# Patient Record
Sex: MALE | Race: WHITE
[De-identification: names, ages, dates, MRNs, and addresses within clinical notes are randomized per-mention and may not be internally consistent; named-entity substitution may affect disease eponyms.]

---

## 2017-05-15 ENCOUNTER — HOSPITAL ENCOUNTER (EMERGENCY)
Dept: HOSPITAL 74 - JER | Age: 52
Discharge: HOME | End: 2017-05-15
Payer: COMMERCIAL

## 2017-05-15 VITALS — SYSTOLIC BLOOD PRESSURE: 133 MMHG | HEART RATE: 60 BPM | DIASTOLIC BLOOD PRESSURE: 54 MMHG | TEMPERATURE: 98.2 F

## 2017-05-15 VITALS — BODY MASS INDEX: 30.1 KG/M2

## 2017-05-15 DIAGNOSIS — H81.10: Primary | ICD-10-CM

## 2017-05-15 DIAGNOSIS — E78.00: ICD-10-CM

## 2017-05-15 DIAGNOSIS — G43.909: ICD-10-CM

## 2017-05-15 DIAGNOSIS — I10: ICD-10-CM

## 2017-05-15 LAB
ALBUMIN SERPL-MCNC: 3.9 G/DL (ref 3.4–5)
ALP SERPL-CCNC: 77 U/L (ref 45–117)
ALT SERPL-CCNC: 33 U/L (ref 12–78)
ANION GAP SERPL CALC-SCNC: 11 MMOL/L (ref 8–16)
AST SERPL-CCNC: 17 U/L (ref 15–37)
BASOPHILS # BLD: 0.7 % (ref 0–2)
BILIRUB SERPL-MCNC: 1.2 MG/DL (ref 0.2–1)
CALCIUM SERPL-MCNC: 8.6 MG/DL (ref 8.5–10.1)
CO2 SERPL-SCNC: 28 MMOL/L (ref 21–32)
COCKROFT - GAULT: 115.09
CREAT SERPL-MCNC: 1 MG/DL (ref 0.7–1.3)
DEPRECATED RDW RBC AUTO: 14 % (ref 11.9–15.9)
EOSINOPHIL # BLD: 1.8 % (ref 0–4.5)
GLUCOSE SERPL-MCNC: 85 MG/DL (ref 74–106)
INR BLD: 1.03 (ref 0.82–1.09)
MCH RBC QN AUTO: 29.2 PG (ref 25.7–33.7)
MCHC RBC AUTO-ENTMCNC: 32.6 G/DL (ref 32–35.9)
MCV RBC: 89.6 FL (ref 80–96)
NEUTROPHILS # BLD: 68.6 % (ref 42.8–82.8)
PLATELET # BLD AUTO: 165 K/MM3 (ref 134–434)
PMV BLD: 8.8 FL (ref 7.5–11.1)
PROT SERPL-MCNC: 7.5 G/DL (ref 6.4–8.2)
PT PNL PPP: 11.3 SEC (ref 9.98–11.88)
TROPONIN I SERPL-MCNC: < 0.02 NG/ML (ref 0–0.05)
WBC # BLD AUTO: 6.8 K/MM3 (ref 4–10)

## 2017-05-15 NOTE — PDOC
*Physical Exam





- Vital Signs


 Last Vital Signs











Temp Pulse Resp BP Pulse Ox


 


 97.5 F L  85   18   150/98   100 


 


 05/15/17 07:52  05/15/17 07:52  05/15/17 07:52  05/15/17 07:52  05/15/17 07:52














ED Treatment Course





- LABORATORY


CBC & Chemistry Diagram: 


 05/15/17 08:52





 05/15/17 08:52





- ADDITIONAL ORDERS


Additional order review: 


 Laboratory  Results











  05/15/17 05/15/17 05/15/17





  08:52 08:52 08:52


 


INR    1.03


 


Sodium   141 


 


Potassium   4.1 


 


Chloride   102 


 


Carbon Dioxide   28 


 


Anion Gap   11 


 


BUN   20 H 


 


Creatinine   1.0 


 


Creat Clearance w eGFR   > 60 


 


Random Glucose   85 


 


Calcium   8.6 


 


Total Bilirubin   1.2 H 


 


AST   17 


 


ALT   33 


 


Alkaline Phosphatase   77 


 


Creatine Kinase   176 


 


Creatine Kinase Index   1.2 


 


CK-MB (CK-2)   2.021 


 


CK-MB (CK-2) Rel Index  Cancelled  


 


Troponin I   < 0.02 


 


Total Protein   7.5 


 


Albumin   3.9 








 











  05/15/17





  08:52


 


RBC  5.54


 


MCV  89.6


 


MCHC  32.6


 


RDW  14.0


 


MPV  8.8


 


Neutrophils %  68.6


 


Lymphocytes %  21.9


 


Monocytes %  7.0


 


Eosinophils %  1.8


 


Basophils %  0.7














- Medications


Given in the ED: 


ED Medications














Discontinued Medications














Generic Name Dose Route Start Last Admin





  Trade Name Freq  PRN Reason Stop Dose Admin


 


Sodium Chloride  1,000 mls @ 1,000 mls/hr 05/15/17 08:59 05/15/17 09:50





  Normal Saline -  IV 05/15/17 09:58  1,000 mls/hr





  ASDIR STA   Administration


 


Meclizine HCl  25 mg 05/15/17 08:59 05/15/17 09:50





  Antivert -  PO 05/15/17 09:00  25 mg





  ONCE ONE   Administration














Medical Decision Making





- Medical Decision Making





05/15/17 11:15


52yo male with h/o HTN , child toussaint allergies, here wtih c/o vertigo like 

symtpoms. noted when tipping head back, worse wtih turning and movement. no 

gait instability. no tinnitus, no f/c no mod factors. no weaknes, no change to 

speech. 


on exam pt awake, alert. CTAB no wheeze no crackle. heart RRR no m/r/g. abd 

soft NT. skin warm and dry. nuero. awake alert, CN intact, facies symmetric. 

finger to nose normal, heel to shin normal. alt hand movement normal. gait 

normal, neg romberg. pt





plan : pt wtih positional vertigo. ct ordered normal. lasb unremarkable. 

improved wtih meclizine. dc with out pt ENT followup.





*DC/Admit/Observation/Transfer


Diagnosis at time of Disposition: 


 Vertigo





- Prescriptions


Prescriptions: 


Meclizine HCl 25 mg PO Q8H PRN #30 tablet


 PRN Reason: Vertigo





- Referrals


Referrals: 


Zhen Crawford MD [Staff Physician] - 3 days





- Patient Instructions


Printed Discharge Instructions:  DI for Benign Paroxysmal Positional Vertigo


Additional Instructions: 


-Rest and stay well-hydrated


-Take Meclizine as prescribed if needed for vertigo symptoms


-Follow up with neurology this week (referral enclosed)


-Return here for worsening dizziness, weakness in your arms or legs, change in 

speech, inability to walk well, or any other concerning symptoms





- Post Discharge Activity


Work/School Note:  Back to Work

## 2017-05-15 NOTE — PDOC
History of Present Illness





- General


Chief Complaint: Lightheaded


Stated Complaint: DIZZINESS


Time Seen by Provider: 05/15/17 08:39


History Source: Patient


Exam Limitations: No Limitations





- History of Present Illness


Initial Comments: 


05/15/17 08:42


CHIEF COMPLAINT: Dizziness





HISTORY OF PRESENT ILLNESS: This is a 51 year old male with a history of HTN, 

HLD, and migraines who presents complaining of dizziness. He reports that he 

was standing at work this morning when he tilted his head back to drink some 

iced tea and suddenly felt that the room was spinning. He felt nauseated and 

"off balance". The episode passed after a few seconds. He then walked to his 

car and experienced the same symptoms again twice, for a few seconds each time. 

He denies headache, focal weakness, change in speech, change in vision, chest 

pain, shortness of breath, or any other symptoms. On arrival here, he feels 

better but is still complaining of some dizziness.





V/s on arrival are notable for /98.





PCP is located in [place].





REVIEW OF SYSTEMS:


GENERAL/CONSTITUTIONAL: No fever or chills. No weakness. No weight change.


HEAD, EYES, EARS, NOSE AND THROAT: No change in vision. No ear pain or 

discharge. No sore throat.


CARDIOVASCULAR: No chest pain or palpitations.


RESPIRATORY: No cough, wheezing, or shortness of breath.


GASTROINTESTINAL: Nausea. No vomiting, diarrhea or constipation. 


GENITOURINARY: No dysuria, frequency, or change in urination.


MUSCULOSKELETAL: No joint or muscle swelling or pain. No neck or back pain.


SKIN: No rash or easy bruising.


NEUROLOGIC: See HPI.


PSYCHIATRIC: No depression or anxiety.


ENDOCRINE: No increased thirst. No abnormal weight change.


HEMATOLOGIC/LYMPHATIC: No anemia, easy bleeding, or history of blood clots.


ALLERGIC/IMMUNOLOGIC: No hives or skin allergy. No latex allergy.





PHYSICAL EXAM:


GENERAL: The patient is awake, alert, and fully oriented, in no acute distress.


HEAD: Normal with no signs of trauma.


ENT: Pupils equal, round and reactive to light, extraocular movements intact, 

sclera anicteric, conjunctiva clear. Neck supple.


LUNGS: Clear to auscultation bilaterally. Normal excursion. No respiratory 

distress or use of accessory muscles.


CV: RRR, S1/S2, no MRG. Cap refill < 2 sec.


ABDOMEN: Soft, non-distended, non-tender.


EXTREMITIES: Normal range of motion, no edema.


NEUROLOGICAL: Normal speech, normal gait. CN II-XII grossly intact. No 

dysarthria, dysmetria, or dysdiadochokenesis. Lateral nystagmus.


PSYCH: Normal mood, normal affect.


SKIN: Warm, moist, normal turgor, no rashes or lesions noted.











NIH Stroke Scale





- Last Known Well Date/Time & Onset


Date Last Known Well: 05/15/17


Time Last Known Well: 08:00





- Initial Evaluation


Level of consciousness: Alert


Ask patient the month and their age: Answers both correctly


Ask patient to open & close eyes; make fist and let go: Obeys both correctly


Best gaze (horizontal eye movement): Normal


Visual field testing: No visual field loss


Facial paresis (Show teeth/raise eyebrows/close eyes tight): Normal symmetrical 

movement


Motor Function: Left Arm: Normal


Motor Function:  Right Arm: Normal (extends arm 90 (or 45) degrees for 10 

seconds without drift


Motor Function:  Left Leg: Normal (extends leg 30 degrees for 5 seconds without 

drift)


Motor Function:  Right Leg: Normal (extends leg 30 degrees for 5 seconds 

without drift)


Limb Ataxia: No ataxia


Sensory(Use pinprick test arms,legs,trunk,face/side to side): Normal


Best language (Describe picture, name items, read sentences): No Aphasia


Dysarthria (read several words): Normal articulation


Extinction and Inattention: No abnormality





- Total Score


NIH Stroke Scale Score: 0





Past History





- Past Medical History


Allergies/Adverse Reactions: 


 Allergies











Allergy/AdvReac Type Severity Reaction Status Date / Time


 


No Known Allergies Allergy   Verified 05/15/17 10:04











Home Medications: 


Ambulatory Orders





Amlodipine Besylate [Norvasc -] 5 mg PO DAILY 05/15/17 


Lisinopril [Prinivil] 10 mg PO DAILY 05/15/17 


Meclizine HCl 25 mg PO Q8H PRN #30 tablet 05/15/17 








GI Disorders:  (Inguinal hernia)


HTN: Yes


Hypercholesterolemia: Yes





- Psycho/Social/Smoking Cessation Hx


Suicidal Ideation: No


Smoking History: Never smoked


Information on smoking cessation initiated: No


Hx Alcohol Use: No


Drug/Substance Use Hx: No


Substance Use Type: None





*Physical Exam





- Vital Signs


 Last Vital Signs











Temp Pulse Resp BP Pulse Ox


 


 97.5 F L  85   18   150/98   100 


 


 05/15/17 07:52  05/15/17 07:52  05/15/17 07:52  05/15/17 07:52  05/15/17 07:52














**Heart Score/ECG Review





- ECG Intrepretation


Comment:: 





05/15/17 10:55


NSR with sinus arrhythmia at 79 bpm





ED Treatment Course





- LABORATORY


CBC & Chemistry Diagram: 


 05/15/17 08:52





 05/15/17 08:52





Medical Decision Making





- Medical Decision Making


05/15/17 09:34


A/P: 51 year old male with dizziness. NIHSS=0.





1. EKG


2. Cardiac labs


3. HCT


4. Trial of IVF, meclizine for symptomatic relief


5. Re-assess











05/15/17 09:39


HCT: No acute intracranial process


Troponin <0.02








05/15/17 10:55


Patient re-evaluated and is no longer dizzy. He is ambulatory with steady gait.





*DC/Admit/Observation/Transfer


Diagnosis at time of Disposition: 


 Vertigo





- Discharge Dispostion


Admit: No





- Prescriptions


Prescriptions: 


Meclizine HCl 25 mg PO Q8H PRN #30 tablet


 PRN Reason: Vertigo





- Referrals


Referrals: 


Zhen Crawford MD [Staff Physician] - 3 days





- Patient Instructions


Printed Discharge Instructions:  DI for Benign Paroxysmal Positional Vertigo


Additional Instructions: 


-Rest and stay well-hydrated


-Take Meclizine as prescribed if needed for vertigo symptoms


-Follow up with neurology this week (referral enclosed)


-Return here for worsening dizziness, weakness in your arms or legs, change in 

speech, inability to walk well, or any other concerning symptoms





- Post Discharge Activity


Work/School Note:  Back to Work

## 2017-05-15 NOTE — EKG
Test Reason : 

Blood Pressure : ***/*** mmHG

Vent. Rate : 079 BPM     Atrial Rate : 079 BPM

   P-R Int : 158 ms          QRS Dur : 092 ms

    QT Int : 356 ms       P-R-T Axes : 041 011 014 degrees

   QTc Int : 408 ms

 

NORMAL SINUS RHYTHM WITH SINUS ARRHYTHMIA

MINIMAL VOLTAGE CRITERIA FOR LVH, MAY BE NORMAL VARIANT

BORDERLINE ECG

NO PREVIOUS ECGS AVAILABLE

Confirmed by RITA ESCOBAR MD ([id number]) on 5/15/2017 10:25:04 AM

 

Referred By:             Confirmed By:RITA ESCOBAR MD

## 2021-03-28 ENCOUNTER — FORM ENCOUNTER (OUTPATIENT)
Age: 56
End: 2021-03-28

## 2021-03-29 ENCOUNTER — TRANSCRIPTION ENCOUNTER (OUTPATIENT)
Age: 56
End: 2021-03-29

## 2021-03-29 PROBLEM — Z00.00 ENCOUNTER FOR PREVENTIVE HEALTH EXAMINATION: Status: ACTIVE | Noted: 2021-03-29

## 2021-04-01 ENCOUNTER — APPOINTMENT (OUTPATIENT)
Dept: DISASTER EMERGENCY | Facility: HOSPITAL | Age: 56
End: 2021-04-01

## 2021-04-02 ENCOUNTER — TRANSCRIPTION ENCOUNTER (OUTPATIENT)
Age: 56
End: 2021-04-02

## 2021-04-03 ENCOUNTER — APPOINTMENT (OUTPATIENT)
Dept: DISASTER EMERGENCY | Facility: HOSPITAL | Age: 56
End: 2021-04-03

## 2021-04-04 ENCOUNTER — TRANSCRIPTION ENCOUNTER (OUTPATIENT)
Age: 56
End: 2021-04-04

## 2021-04-05 ENCOUNTER — APPOINTMENT (OUTPATIENT)
Dept: DISASTER EMERGENCY | Facility: HOSPITAL | Age: 56
End: 2021-04-05

## 2021-04-06 ENCOUNTER — APPOINTMENT (OUTPATIENT)
Dept: DISASTER EMERGENCY | Facility: HOSPITAL | Age: 56
End: 2021-04-06

## 2022-09-26 ENCOUNTER — NON-APPOINTMENT (OUTPATIENT)
Age: 57
End: 2022-09-26

## 2022-09-27 ENCOUNTER — EMERGENCY (EMERGENCY)
Facility: HOSPITAL | Age: 57
LOS: 1 days | Discharge: HOME | End: 2022-09-27
Attending: EMERGENCY MEDICINE
Payer: COMMERCIAL

## 2022-09-27 VITALS
RESPIRATION RATE: 18 BRPM | OXYGEN SATURATION: 100 % | SYSTOLIC BLOOD PRESSURE: 154 MMHG | DIASTOLIC BLOOD PRESSURE: 93 MMHG | WEIGHT: 203.93 LBS | HEART RATE: 77 BPM | TEMPERATURE: 98 F | HEIGHT: 71 IN

## 2022-09-27 VITALS
HEART RATE: 71 BPM | DIASTOLIC BLOOD PRESSURE: 91 MMHG | RESPIRATION RATE: 17 BRPM | TEMPERATURE: 98 F | OXYGEN SATURATION: 100 % | SYSTOLIC BLOOD PRESSURE: 143 MMHG

## 2022-09-27 LAB
ALBUMIN SERPL ELPH-MCNC: 4.6 G/DL — SIGNIFICANT CHANGE UP (ref 3.3–5)
ALP SERPL-CCNC: 68 U/L — SIGNIFICANT CHANGE UP (ref 40–120)
ALT FLD-CCNC: 16 U/L — SIGNIFICANT CHANGE UP (ref 10–45)
ANION GAP SERPL CALC-SCNC: 11 MMOL/L — SIGNIFICANT CHANGE UP (ref 5–17)
AST SERPL-CCNC: 19 U/L — SIGNIFICANT CHANGE UP (ref 10–40)
BASOPHILS # BLD AUTO: 0.03 K/UL — SIGNIFICANT CHANGE UP (ref 0–0.2)
BASOPHILS NFR BLD AUTO: 0.3 % — SIGNIFICANT CHANGE UP (ref 0–2)
BILIRUB SERPL-MCNC: 1.2 MG/DL — SIGNIFICANT CHANGE UP (ref 0.2–1.2)
BUN SERPL-MCNC: 20 MG/DL — SIGNIFICANT CHANGE UP (ref 7–23)
CALCIUM SERPL-MCNC: 9.2 MG/DL — SIGNIFICANT CHANGE UP (ref 8.4–10.5)
CHLORIDE SERPL-SCNC: 102 MMOL/L — SIGNIFICANT CHANGE UP (ref 96–108)
CO2 SERPL-SCNC: 25 MMOL/L — SIGNIFICANT CHANGE UP (ref 22–31)
CREAT SERPL-MCNC: 1.17 MG/DL — SIGNIFICANT CHANGE UP (ref 0.5–1.3)
EGFR: 72 ML/MIN/1.73M2 — SIGNIFICANT CHANGE UP
EOSINOPHIL # BLD AUTO: 0 K/UL — SIGNIFICANT CHANGE UP (ref 0–0.5)
EOSINOPHIL NFR BLD AUTO: 0 % — SIGNIFICANT CHANGE UP (ref 0–6)
GLUCOSE SERPL-MCNC: 91 MG/DL — SIGNIFICANT CHANGE UP (ref 70–99)
HCT VFR BLD CALC: 49.9 % — SIGNIFICANT CHANGE UP (ref 39–50)
HGB BLD-MCNC: 15.7 G/DL — SIGNIFICANT CHANGE UP (ref 13–17)
IMM GRANULOCYTES NFR BLD AUTO: 0.4 % — SIGNIFICANT CHANGE UP (ref 0–0.9)
LYMPHOCYTES # BLD AUTO: 1 K/UL — SIGNIFICANT CHANGE UP (ref 1–3.3)
LYMPHOCYTES # BLD AUTO: 10.6 % — LOW (ref 13–44)
MCHC RBC-ENTMCNC: 28.9 PG — SIGNIFICANT CHANGE UP (ref 27–34)
MCHC RBC-ENTMCNC: 31.5 GM/DL — LOW (ref 32–36)
MCV RBC AUTO: 91.7 FL — SIGNIFICANT CHANGE UP (ref 80–100)
MONOCYTES # BLD AUTO: 0.4 K/UL — SIGNIFICANT CHANGE UP (ref 0–0.9)
MONOCYTES NFR BLD AUTO: 4.3 % — SIGNIFICANT CHANGE UP (ref 2–14)
NEUTROPHILS # BLD AUTO: 7.92 K/UL — HIGH (ref 1.8–7.4)
NEUTROPHILS NFR BLD AUTO: 84.4 % — HIGH (ref 43–77)
NRBC # BLD: 0 /100 WBCS — SIGNIFICANT CHANGE UP (ref 0–0)
PLATELET # BLD AUTO: 175 K/UL — SIGNIFICANT CHANGE UP (ref 150–400)
POTASSIUM SERPL-MCNC: 4 MMOL/L — SIGNIFICANT CHANGE UP (ref 3.5–5.3)
POTASSIUM SERPL-SCNC: 4 MMOL/L — SIGNIFICANT CHANGE UP (ref 3.5–5.3)
PROT SERPL-MCNC: 7.9 G/DL — SIGNIFICANT CHANGE UP (ref 6–8.3)
RBC # BLD: 5.44 M/UL — SIGNIFICANT CHANGE UP (ref 4.2–5.8)
RBC # FLD: 13.8 % — SIGNIFICANT CHANGE UP (ref 10.3–14.5)
SODIUM SERPL-SCNC: 138 MMOL/L — SIGNIFICANT CHANGE UP (ref 135–145)
WBC # BLD: 9.39 K/UL — SIGNIFICANT CHANGE UP (ref 3.8–10.5)
WBC # FLD AUTO: 9.39 K/UL — SIGNIFICANT CHANGE UP (ref 3.8–10.5)

## 2022-09-27 PROCEDURE — 99285 EMERGENCY DEPT VISIT HI MDM: CPT | Mod: 25

## 2022-09-27 PROCEDURE — 85025 COMPLETE CBC W/AUTO DIFF WBC: CPT

## 2022-09-27 PROCEDURE — 93005 ELECTROCARDIOGRAM TRACING: CPT

## 2022-09-27 PROCEDURE — 99285 EMERGENCY DEPT VISIT HI MDM: CPT

## 2022-09-27 PROCEDURE — 93010 ELECTROCARDIOGRAM REPORT: CPT | Mod: NC

## 2022-09-27 PROCEDURE — 70450 CT HEAD/BRAIN W/O DYE: CPT | Mod: 26,MA

## 2022-09-27 PROCEDURE — 80053 COMPREHEN METABOLIC PANEL: CPT

## 2022-09-27 PROCEDURE — 70450 CT HEAD/BRAIN W/O DYE: CPT | Mod: MA

## 2022-09-27 PROCEDURE — 36415 COLL VENOUS BLD VENIPUNCTURE: CPT

## 2022-09-27 NOTE — ED ADULT TRIAGE NOTE - CHIEF COMPLAINT QUOTE
concern for momentary hazy vision while staring at the computer screen and elevated BP on home device (BP WNL in ED).

## 2022-09-27 NOTE — ED PROVIDER NOTE - OBJECTIVE STATEMENT
56 y/o male with PMHx of HTN presents to the ED complaining of blurry vision today. Patient states that he was staring at his computer screen around 8 am today when he suddenly had an episode of "fuzzy vison". He believes it was mostly in his left eye. He states that this lasted for about 10 minutes and then resolved spontaneously. Patient had his eyes checked by retinal specialist recently. States that after this episode he checked his BP which was 180/80. He then went to urgent care and was sent to the ED for evaluation. He states that normally his BP is 130 systolic. Denies headache, recent illness, fevers, chills, vomiting, diarrhea. Currently vision is at baseline.

## 2022-09-27 NOTE — ED ADULT TRIAGE NOTE - BEFAST ARM SIDE DRIFT
Pt attended Phase II Cardiac Rehab Exercise Session.  Further documentation completed in [employer] [facility].     No

## 2022-09-27 NOTE — ED PROVIDER NOTE - PHYSICAL EXAMINATION
CONSTITUTIONAL: Patient is awake, alert and oriented x 3. Patient is well appearing and in no acute distress.  HEAD: NCAT  EYES: PERRL bilaterally, EOMI,   ENT: Airway patent, Nasal mucosa clear  NECK: Supple,   LUNGS: CTA B/L,  HEART: RRR.+S1S2 no murmurs,   ABDOMEN: Soft, non-tender to palpation throughout all four quadrants,    MSK: FROM upper and lower ext b/l,   SKIN: No rash or lesions  NEURO: No focal deficits, Strength5/5 UE and LE b/l; Sensation intact; Gait normal

## 2022-09-27 NOTE — ED PROVIDER NOTE - NS ED ATTENDING STATEMENT MOD
This was a shared visit with the JHONY. I reviewed and verified the documentation and independently performed the documented:

## 2022-09-27 NOTE — ED PROVIDER NOTE - PROGRESS NOTE DETAILS
attending Roxana: results reviewed at length with patient. Plan for dc with logging home BPs and close outpatient follow-up with his cardiologist for possible adjustment of BP meds. Strict return precautions discussed at length

## 2022-09-27 NOTE — ED PROVIDER NOTE - PATIENT PORTAL LINK FT
You can access the FollowMyHealth Patient Portal offered by [facility] by registering at the following website: [email or web address]. By joining [employer]’s FollowMyHealth portal, you will also be able to view your health information using other applications (apps) compatible with our system.

## 2022-09-27 NOTE — ED PROVIDER NOTE - NSFOLLOWUPINSTRUCTIONS_ED_ALL_ED_FT
Stay well hydrated.  Continue with home medications as prescribed.  Keep a log of your blood pressures (morning and night) to review with your primary doctor/cardiologist.   Follow-up with your primary doctor/cardiologist within 1-2 days  Bring a copy of your results with you  Return to an ER for worsening symptoms or any other concerns.

## 2022-09-27 NOTE — ED PROVIDER NOTE - ATTENDING APP SHARED VISIT CONTRIBUTION OF CARE
attending Roxana: 57yM h/o HTN, compliant with medications, presents after episode of blurred vision this morning around 8am while looking at his computer screen. Lasted for minutes, then resolved spontaneously. Checked his BP at home, was elevated, evaluated by urgent care and sent in to ER. Nonfocal neuro exam, currently asymptomatic. Will obtain labs, serial BPs, CTH, reassess

## 2022-09-27 NOTE — ED ADULT NURSE NOTE - OBJECTIVE STATEMENT
57y m pt with hx of hypertension c/o episode of "something off with my vision" this am; pt had optho check x 2 days ago; no changes; pt has hx of migraine thought might have been a migraine coming on; no head pain; pt thought might be detached retina; father  of heart attack at 44; mother  of stroke in early 70s; pt anxious regarding family hx; aox3; no resp distress; no chest pain; no abd pain; no n/v/d; denies fever/chills; no cough/congestion; ekg done; pt placed on cardiac monitor in nsr; iv placed; labs drawn/sent; call bell in reach

## 2022-12-06 ENCOUNTER — OFFICE (OUTPATIENT)
Dept: URBAN - METROPOLITAN AREA CLINIC 32 | Facility: CLINIC | Age: 57
Setting detail: OPHTHALMOLOGY
End: 2022-12-06
Payer: COMMERCIAL

## 2022-12-06 DIAGNOSIS — H43.811: ICD-10-CM

## 2022-12-06 DIAGNOSIS — H43.392: ICD-10-CM

## 2022-12-06 PROCEDURE — 92201 OPSCPY EXTND RTA DRAW UNI/BI: CPT | Performed by: OPHTHALMOLOGY

## 2022-12-06 PROCEDURE — 92012 INTRM OPH EXAM EST PATIENT: CPT | Performed by: OPHTHALMOLOGY

## 2022-12-06 PROCEDURE — 92134 CPTRZ OPH DX IMG PST SGM RTA: CPT | Performed by: OPHTHALMOLOGY

## 2022-12-06 ASSESSMENT — REFRACTION_AUTOREFRACTION
OS_CYLINDER: -1.25
OD_SPHERE: -5.00
OS_SPHERE: -5.50
OD_AXIS: 176
OD_CYLINDER: -1.25
OS_AXIS: 170

## 2022-12-06 ASSESSMENT — CONFRONTATIONAL VISUAL FIELD TEST (CVF)
OD_FINDINGS: FULL
OS_FINDINGS: FULL

## 2022-12-06 ASSESSMENT — KERATOMETRY
OS_K2POWER_DIOPTERS: 45.25
OD_AXISANGLE_DEGREES: 086
OD_K2POWER_DIOPTERS: 44.00
OD_K1POWER_DIOPTERS: 42.75
OS_AXISANGLE_DEGREES: 085
OS_K1POWER_DIOPTERS: 44.00

## 2022-12-06 ASSESSMENT — AXIALLENGTH_DERIVED
OD_AL: 26.0638
OS_AL: 25.7451

## 2022-12-06 ASSESSMENT — CORNEAL TRAUMA - ABRASION: OS_ABRASION: ABSENT

## 2022-12-06 ASSESSMENT — VISUAL ACUITY
OS_BCVA: 20/20
OD_BCVA: 20/25-2

## 2022-12-06 ASSESSMENT — SPHEQUIV_DERIVED
OD_SPHEQUIV: -5.625
OS_SPHEQUIV: -6.125

## 2022-12-19 ENCOUNTER — NON-APPOINTMENT (OUTPATIENT)
Age: 57
End: 2022-12-19

## 2022-12-19 ENCOUNTER — OFFICE (OUTPATIENT)
Dept: URBAN - METROPOLITAN AREA CLINIC 32 | Facility: CLINIC | Age: 57
Setting detail: OPHTHALMOLOGY
End: 2022-12-19
Payer: COMMERCIAL

## 2022-12-19 DIAGNOSIS — H43.392: ICD-10-CM

## 2022-12-19 DIAGNOSIS — H43.811: ICD-10-CM

## 2022-12-19 PROCEDURE — 92012 INTRM OPH EXAM EST PATIENT: CPT | Performed by: OPHTHALMOLOGY

## 2022-12-19 PROCEDURE — 92134 CPTRZ OPH DX IMG PST SGM RTA: CPT | Performed by: OPHTHALMOLOGY

## 2022-12-19 PROCEDURE — 92201 OPSCPY EXTND RTA DRAW UNI/BI: CPT | Performed by: OPHTHALMOLOGY

## 2022-12-19 ASSESSMENT — KERATOMETRY
OS_K1POWER_DIOPTERS: 44.00
OS_AXISANGLE_DEGREES: 085
OD_AXISANGLE_DEGREES: 086
OS_K2POWER_DIOPTERS: 45.25
OD_K1POWER_DIOPTERS: 42.75
OD_K2POWER_DIOPTERS: 44.00

## 2022-12-19 ASSESSMENT — CORNEAL TRAUMA - ABRASION: OS_ABRASION: ABSENT

## 2022-12-19 ASSESSMENT — SPHEQUIV_DERIVED
OS_SPHEQUIV: -6.125
OD_SPHEQUIV: -5.625

## 2022-12-19 ASSESSMENT — REFRACTION_AUTOREFRACTION
OS_AXIS: 170
OD_CYLINDER: -1.25
OS_CYLINDER: -1.25
OS_SPHERE: -5.50
OD_AXIS: 176
OD_SPHERE: -5.00

## 2022-12-19 ASSESSMENT — AXIALLENGTH_DERIVED
OS_AL: 25.7451
OD_AL: 26.0638

## 2022-12-19 ASSESSMENT — VISUAL ACUITY
OS_BCVA: 20/20
OD_BCVA: 20/20

## 2023-01-04 ENCOUNTER — OFFICE (OUTPATIENT)
Dept: URBAN - METROPOLITAN AREA CLINIC 35 | Facility: CLINIC | Age: 58
Setting detail: OPHTHALMOLOGY
End: 2023-01-04
Payer: COMMERCIAL

## 2023-01-04 DIAGNOSIS — H16.042: ICD-10-CM

## 2023-01-04 DIAGNOSIS — H57.12: ICD-10-CM

## 2023-01-04 DIAGNOSIS — B02.1: ICD-10-CM

## 2023-01-04 PROCEDURE — 99213 OFFICE O/P EST LOW 20 MIN: CPT | Performed by: OPHTHALMOLOGY

## 2023-01-04 ASSESSMENT — AXIALLENGTH_DERIVED
OS_AL: 25.7451
OD_AL: 26.0638

## 2023-01-04 ASSESSMENT — REFRACTION_AUTOREFRACTION
OS_AXIS: 170
OS_SPHERE: -5.50
OD_AXIS: 176
OD_CYLINDER: -1.25
OD_SPHERE: -5.00
OS_CYLINDER: -1.25

## 2023-01-04 ASSESSMENT — SPHEQUIV_DERIVED
OD_SPHEQUIV: -5.625
OS_SPHEQUIV: -6.125

## 2023-01-04 ASSESSMENT — CONFRONTATIONAL VISUAL FIELD TEST (CVF)
OD_FINDINGS: FULL
OS_FINDINGS: FULL

## 2023-01-04 ASSESSMENT — VISUAL ACUITY
OD_BCVA: 20/20-1
OS_BCVA: 20/20

## 2023-01-04 ASSESSMENT — KERATOMETRY
OS_K2POWER_DIOPTERS: 45.25
OS_K1POWER_DIOPTERS: 44.00
OD_K1POWER_DIOPTERS: 42.75
OD_K2POWER_DIOPTERS: 44.00
OS_AXISANGLE_DEGREES: 085
OD_AXISANGLE_DEGREES: 086

## 2023-01-04 ASSESSMENT — CORNEAL TRAUMA - ABRASION: OS_ABRASION: ABSENT

## 2023-01-06 ENCOUNTER — OFFICE (OUTPATIENT)
Dept: URBAN - METROPOLITAN AREA CLINIC 35 | Facility: CLINIC | Age: 58
Setting detail: OPHTHALMOLOGY
End: 2023-01-06
Payer: COMMERCIAL

## 2023-01-06 DIAGNOSIS — H16.042: ICD-10-CM

## 2023-01-06 DIAGNOSIS — H57.12: ICD-10-CM

## 2023-01-06 DIAGNOSIS — B02.1: ICD-10-CM

## 2023-01-06 PROBLEM — H43.392 VITREOUS FLOATERS; LEFT EYE: Status: ACTIVE | Noted: 2022-12-06

## 2023-01-06 PROCEDURE — 99213 OFFICE O/P EST LOW 20 MIN: CPT | Performed by: OPHTHALMOLOGY

## 2023-01-06 ASSESSMENT — KERATOMETRY
OS_K1POWER_DIOPTERS: 44.00
OS_K2POWER_DIOPTERS: 45.25
OS_AXISANGLE_DEGREES: 085
OD_K2POWER_DIOPTERS: 44.00
OD_K1POWER_DIOPTERS: 42.75
OD_AXISANGLE_DEGREES: 086

## 2023-01-06 ASSESSMENT — CORNEAL TRAUMA - ABRASION: OS_ABRASION: ABSENT

## 2023-01-06 ASSESSMENT — VISUAL ACUITY
OS_BCVA: 20/20
OD_BCVA: 20/20

## 2023-01-06 ASSESSMENT — REFRACTION_AUTOREFRACTION
OD_CYLINDER: -1.25
OD_SPHERE: -5.00
OS_AXIS: 170
OD_AXIS: 176
OS_CYLINDER: -1.25
OS_SPHERE: -5.50

## 2023-01-06 ASSESSMENT — AXIALLENGTH_DERIVED
OD_AL: 26.0638
OS_AL: 25.7451

## 2023-01-06 ASSESSMENT — CONFRONTATIONAL VISUAL FIELD TEST (CVF)
OS_FINDINGS: FULL
OD_FINDINGS: FULL

## 2023-01-06 ASSESSMENT — SPHEQUIV_DERIVED
OD_SPHEQUIV: -5.625
OS_SPHEQUIV: -6.125

## 2023-01-09 ENCOUNTER — OFFICE (OUTPATIENT)
Dept: URBAN - METROPOLITAN AREA CLINIC 35 | Facility: CLINIC | Age: 58
Setting detail: OPHTHALMOLOGY
End: 2023-01-09
Payer: COMMERCIAL

## 2023-01-09 DIAGNOSIS — B02.1: ICD-10-CM

## 2023-01-09 DIAGNOSIS — H57.12: ICD-10-CM

## 2023-01-09 DIAGNOSIS — H16.042: ICD-10-CM

## 2023-01-09 PROCEDURE — 99213 OFFICE O/P EST LOW 20 MIN: CPT | Performed by: OPHTHALMOLOGY

## 2023-01-09 ASSESSMENT — KERATOMETRY
OD_K1POWER_DIOPTERS: 42.75
OS_K2POWER_DIOPTERS: 45.25
OD_AXISANGLE_DEGREES: 086
OD_K2POWER_DIOPTERS: 44.00
OS_K1POWER_DIOPTERS: 44.00
OS_AXISANGLE_DEGREES: 085

## 2023-01-09 ASSESSMENT — CONFRONTATIONAL VISUAL FIELD TEST (CVF)
OS_FINDINGS: FULL
OD_FINDINGS: FULL

## 2023-01-09 ASSESSMENT — VISUAL ACUITY
OS_BCVA: 20/20
OD_BCVA: 20/30

## 2023-01-09 ASSESSMENT — AXIALLENGTH_DERIVED
OD_AL: 26.0638
OS_AL: 25.7451

## 2023-01-09 ASSESSMENT — REFRACTION_AUTOREFRACTION
OD_CYLINDER: -1.25
OS_AXIS: 170
OS_SPHERE: -5.50
OD_SPHERE: -5.00
OS_CYLINDER: -1.25
OD_AXIS: 176

## 2023-01-09 ASSESSMENT — CORNEAL TRAUMA - ABRASION: OS_ABRASION: ABSENT

## 2023-01-09 ASSESSMENT — SPHEQUIV_DERIVED
OD_SPHEQUIV: -5.625
OS_SPHEQUIV: -6.125

## 2023-01-16 ENCOUNTER — OFFICE (OUTPATIENT)
Dept: URBAN - METROPOLITAN AREA CLINIC 35 | Facility: CLINIC | Age: 58
Setting detail: OPHTHALMOLOGY
End: 2023-01-16
Payer: COMMERCIAL

## 2023-01-16 ENCOUNTER — RX ONLY (RX ONLY)
Age: 58
End: 2023-01-16

## 2023-01-16 DIAGNOSIS — H16.042: ICD-10-CM

## 2023-01-16 DIAGNOSIS — B02.1: ICD-10-CM

## 2023-01-16 DIAGNOSIS — H17.9: ICD-10-CM

## 2023-01-16 PROCEDURE — 99213 OFFICE O/P EST LOW 20 MIN: CPT | Performed by: OPHTHALMOLOGY

## 2023-01-16 ASSESSMENT — AXIALLENGTH_DERIVED
OD_AL: 26.0638
OS_AL: 25.7451

## 2023-01-16 ASSESSMENT — REFRACTION_CURRENTRX
OS_AXIS: 173
OS_OVR_VA: 20/
OD_AXIS: 173
OS_SPHERE: -5.00
OS_CYLINDER: -1.00
OD_CYLINDER: -0.75
OD_OVR_VA: 20/
OD_SPHERE: -5.00
OS_VPRISM_DIRECTION: SV
OD_VPRISM_DIRECTION: SV

## 2023-01-16 ASSESSMENT — KERATOMETRY
OS_K2POWER_DIOPTERS: 45.25
OS_AXISANGLE_DEGREES: 085
OS_K1POWER_DIOPTERS: 44.00
OD_K1POWER_DIOPTERS: 42.75
OD_AXISANGLE_DEGREES: 086
OD_K2POWER_DIOPTERS: 44.00

## 2023-01-16 ASSESSMENT — CONFRONTATIONAL VISUAL FIELD TEST (CVF)
OD_FINDINGS: FULL
OS_FINDINGS: FULL

## 2023-01-16 ASSESSMENT — VISUAL ACUITY
OS_BCVA: 20/25-2+
OD_BCVA: 20/25

## 2023-01-16 ASSESSMENT — CORNEAL SURGICAL SCARRING: OS_SCARRING: ANTERIOR

## 2023-01-16 ASSESSMENT — SPHEQUIV_DERIVED
OS_SPHEQUIV: -6.125
OD_SPHEQUIV: -5.625

## 2023-01-16 ASSESSMENT — TONOMETRY: OS_IOP_MMHG: 17

## 2023-01-16 ASSESSMENT — REFRACTION_AUTOREFRACTION
OD_CYLINDER: -1.25
OD_SPHERE: -5.00
OS_AXIS: 170
OS_CYLINDER: -1.25
OD_AXIS: 176
OS_SPHERE: -5.50

## 2023-02-04 ENCOUNTER — OFFICE (OUTPATIENT)
Dept: URBAN - METROPOLITAN AREA CLINIC 35 | Facility: CLINIC | Age: 58
Setting detail: OPHTHALMOLOGY
End: 2023-02-04
Payer: COMMERCIAL

## 2023-02-04 DIAGNOSIS — H16.041: ICD-10-CM

## 2023-02-04 PROCEDURE — 99213 OFFICE O/P EST LOW 20 MIN: CPT | Performed by: OPHTHALMOLOGY

## 2023-02-04 ASSESSMENT — REFRACTION_AUTOREFRACTION
OS_SPHERE: -5.50
OD_AXIS: 176
OD_CYLINDER: -1.25
OS_AXIS: 170
OD_SPHERE: -5.00
OS_CYLINDER: -1.25

## 2023-02-04 ASSESSMENT — REFRACTION_CURRENTRX
OD_AXIS: 173
OD_CYLINDER: -0.75
OD_VPRISM_DIRECTION: SV
OS_SPHERE: -5.00
OD_SPHERE: -5.00
OD_OVR_VA: 20/
OS_CYLINDER: -1.00
OS_AXIS: 173
OS_VPRISM_DIRECTION: SV
OS_OVR_VA: 20/

## 2023-02-04 ASSESSMENT — KERATOMETRY
OD_K1POWER_DIOPTERS: 42.75
OD_AXISANGLE_DEGREES: 086
OS_K1POWER_DIOPTERS: 44.00
OS_AXISANGLE_DEGREES: 085
OD_K2POWER_DIOPTERS: 44.00
OS_K2POWER_DIOPTERS: 45.25

## 2023-02-04 ASSESSMENT — CONFRONTATIONAL VISUAL FIELD TEST (CVF)
OS_FINDINGS: FULL
OD_FINDINGS: FULL

## 2023-02-04 ASSESSMENT — SPHEQUIV_DERIVED
OD_SPHEQUIV: -5.625
OS_SPHEQUIV: -6.125

## 2023-02-04 ASSESSMENT — VISUAL ACUITY
OS_BCVA: 20/25-
OD_BCVA: 20/25+2

## 2023-02-04 ASSESSMENT — CORNEAL SURGICAL SCARRING: OS_SCARRING: ANTERIOR

## 2023-02-04 ASSESSMENT — AXIALLENGTH_DERIVED
OD_AL: 26.0638
OS_AL: 25.7451

## 2023-02-05 ENCOUNTER — OFFICE (OUTPATIENT)
Dept: URBAN - METROPOLITAN AREA CLINIC 35 | Facility: CLINIC | Age: 58
Setting detail: OPHTHALMOLOGY
End: 2023-02-05
Payer: COMMERCIAL

## 2023-02-05 DIAGNOSIS — H16.041: ICD-10-CM

## 2023-02-05 PROCEDURE — 99213 OFFICE O/P EST LOW 20 MIN: CPT | Performed by: OPHTHALMOLOGY

## 2023-02-05 ASSESSMENT — VISUAL ACUITY
OS_BCVA: 20/30
OD_BCVA: 20/25+2

## 2023-02-05 ASSESSMENT — REFRACTION_CURRENTRX
OD_CYLINDER: -0.75
OS_CYLINDER: -1.00
OD_AXIS: 173
OD_OVR_VA: 20/
OS_VPRISM_DIRECTION: SV
OD_VPRISM_DIRECTION: SV
OD_SPHERE: -5.00
OS_SPHERE: -5.00
OS_AXIS: 173
OS_OVR_VA: 20/

## 2023-02-05 ASSESSMENT — CONFRONTATIONAL VISUAL FIELD TEST (CVF)
OS_FINDINGS: FULL
OD_FINDINGS: FULL

## 2023-02-05 ASSESSMENT — CORNEAL SURGICAL SCARRING: OS_SCARRING: ANTERIOR

## 2023-02-07 ENCOUNTER — OFFICE (OUTPATIENT)
Dept: URBAN - METROPOLITAN AREA CLINIC 35 | Facility: CLINIC | Age: 58
Setting detail: OPHTHALMOLOGY
End: 2023-02-07
Payer: COMMERCIAL

## 2023-02-07 DIAGNOSIS — H16.041: ICD-10-CM

## 2023-02-07 PROCEDURE — 99213 OFFICE O/P EST LOW 20 MIN: CPT | Performed by: OPHTHALMOLOGY

## 2023-02-07 ASSESSMENT — REFRACTION_CURRENTRX
OD_AXIS: 173
OD_VPRISM_DIRECTION: SV
OD_OVR_VA: 20/
OS_OVR_VA: 20/
OS_AXIS: 173
OD_SPHERE: -5.00
OS_VPRISM_DIRECTION: SV
OS_SPHERE: -5.00
OS_CYLINDER: -1.00
OD_CYLINDER: -0.75

## 2023-02-07 ASSESSMENT — CORNEAL SURGICAL SCARRING: OS_SCARRING: ANTERIOR

## 2023-02-07 ASSESSMENT — VISUAL ACUITY
OD_BCVA: 20/20
OS_BCVA: 20/30

## 2023-02-08 ENCOUNTER — NON-APPOINTMENT (OUTPATIENT)
Age: 58
End: 2023-02-08

## 2023-02-09 ENCOUNTER — RX ONLY (RX ONLY)
Age: 58
End: 2023-02-09

## 2023-02-09 ENCOUNTER — OFFICE (OUTPATIENT)
Dept: URBAN - METROPOLITAN AREA CLINIC 35 | Facility: CLINIC | Age: 58
Setting detail: OPHTHALMOLOGY
End: 2023-02-09
Payer: COMMERCIAL

## 2023-02-09 DIAGNOSIS — H10.89: ICD-10-CM

## 2023-02-09 DIAGNOSIS — H16.041: ICD-10-CM

## 2023-02-09 PROCEDURE — 99213 OFFICE O/P EST LOW 20 MIN: CPT | Performed by: OPHTHALMOLOGY

## 2023-02-09 ASSESSMENT — LID EXAM ASSESSMENTS: OD_MEIBOMITIS: RUL

## 2023-02-09 ASSESSMENT — CORNEAL SURGICAL SCARRING: OS_SCARRING: ANTERIOR

## 2023-02-09 ASSESSMENT — REFRACTION_CURRENTRX
OS_SPHERE: -5.00
OS_OVR_VA: 20/
OS_CYLINDER: -1.00
OD_VPRISM_DIRECTION: SV
OS_AXIS: 173
OD_SPHERE: -5.00
OD_CYLINDER: -0.75
OD_OVR_VA: 20/
OD_AXIS: 173
OS_VPRISM_DIRECTION: SV

## 2023-02-09 ASSESSMENT — VISUAL ACUITY
OD_BCVA: 20/20
OS_BCVA: 20/30

## 2023-02-10 ENCOUNTER — OFFICE (OUTPATIENT)
Dept: URBAN - METROPOLITAN AREA CLINIC 35 | Facility: CLINIC | Age: 58
Setting detail: OPHTHALMOLOGY
End: 2023-02-10
Payer: COMMERCIAL

## 2023-02-10 DIAGNOSIS — H16.041: ICD-10-CM

## 2023-02-10 PROBLEM — H17.9 CORNEAL SCAR: Status: ACTIVE | Noted: 2023-01-16

## 2023-02-10 PROBLEM — B02.1 HERPES ZOSTER SHINGLES/ZONA: Status: ACTIVE | Noted: 2023-01-04

## 2023-02-10 PROCEDURE — 99213 OFFICE O/P EST LOW 20 MIN: CPT | Performed by: OPHTHALMOLOGY

## 2023-02-10 ASSESSMENT — CORNEAL SURGICAL SCARRING: OS_SCARRING: ANTERIOR

## 2023-02-10 ASSESSMENT — TONOMETRY
OD_IOP_MMHG: 18
OS_IOP_MMHG: 16

## 2023-02-10 ASSESSMENT — REFRACTION_CURRENTRX
OD_VPRISM_DIRECTION: SV
OS_OVR_VA: 20/
OS_AXIS: 173
OS_VPRISM_DIRECTION: SV
OS_SPHERE: -5.00
OD_SPHERE: -5.00
OD_AXIS: 173
OS_CYLINDER: -1.00
OD_CYLINDER: -0.75
OD_OVR_VA: 20/

## 2023-02-10 ASSESSMENT — VISUAL ACUITY
OS_BCVA: 20/30
OD_BCVA: 20/20

## 2023-02-10 ASSESSMENT — CONFRONTATIONAL VISUAL FIELD TEST (CVF)
OS_FINDINGS: FULL
OD_FINDINGS: FULL

## 2023-02-13 ENCOUNTER — NON-APPOINTMENT (OUTPATIENT)
Age: 58
End: 2023-02-13

## 2023-02-17 ENCOUNTER — OFFICE (OUTPATIENT)
Dept: URBAN - METROPOLITAN AREA CLINIC 35 | Facility: CLINIC | Age: 58
Setting detail: OPHTHALMOLOGY
End: 2023-02-17
Payer: COMMERCIAL

## 2023-02-17 DIAGNOSIS — H10.89: ICD-10-CM

## 2023-02-17 PROBLEM — H25.13: Status: ACTIVE | Noted: 2023-02-10

## 2023-02-17 PROBLEM — H16.041 CORNEAL ULCER; RIGHT EYE: Status: RESOLVED | Noted: 2023-02-04 | Resolved: 2023-02-17

## 2023-02-17 PROBLEM — H16.421 CORNEAL PANNUS; RIGHT EYE: Status: ACTIVE | Noted: 2023-02-04

## 2023-02-17 PROCEDURE — 99213 OFFICE O/P EST LOW 20 MIN: CPT | Performed by: OPHTHALMOLOGY

## 2023-02-17 ASSESSMENT — REFRACTION_CURRENTRX
OD_SPHERE: -5.00
OS_SPHERE: -5.00
OS_AXIS: 173
OD_CYLINDER: -0.75
OS_OVR_VA: 20/
OS_CYLINDER: -1.00
OS_VPRISM_DIRECTION: SV
OD_VPRISM_DIRECTION: SV
OD_OVR_VA: 20/
OD_AXIS: 173

## 2023-02-17 ASSESSMENT — REFRACTION_AUTOREFRACTION
OD_AXIS: 092
OD_CYLINDER: +1.50
OD_SPHERE: -6.25
OS_AXIS: 077
OS_SPHERE: -6.50
OS_CYLINDER: +1.00

## 2023-02-17 ASSESSMENT — KERATOMETRY
OS_K1POWER_DIOPTERS: 44.00
OD_K2POWER_DIOPTERS: 44.25
OD_AXISANGLE_DEGREES: 091
OS_AXISANGLE_DEGREES: 081
OD_K1POWER_DIOPTERS: 42.75
OS_K2POWER_DIOPTERS: 45.00

## 2023-02-17 ASSESSMENT — CONFRONTATIONAL VISUAL FIELD TEST (CVF)
OD_FINDINGS: FULL
OS_FINDINGS: FULL

## 2023-02-17 ASSESSMENT — SPHEQUIV_DERIVED
OS_SPHEQUIV: -6
OD_SPHEQUIV: -5.5

## 2023-02-17 ASSESSMENT — TONOMETRY
OS_IOP_MMHG: 16
OD_IOP_MMHG: 18

## 2023-02-17 ASSESSMENT — VISUAL ACUITY
OD_BCVA: 20/20-1
OS_BCVA: 20/20

## 2023-02-17 ASSESSMENT — VASCULARIZATION: OD_VASCULARIZATION: PANNUS

## 2023-02-17 ASSESSMENT — CORNEAL SURGICAL SCARRING: OS_SCARRING: ANTERIOR

## 2023-02-17 ASSESSMENT — AXIALLENGTH_DERIVED
OS_AL: 25.7418
OD_AL: 25.9488

## 2023-02-17 ASSESSMENT — CORNEAL DYSTROPHY: OD_DYSTROPHY: T MAPS SUPERIORLY

## 2023-04-26 ENCOUNTER — NON-APPOINTMENT (OUTPATIENT)
Age: 58
End: 2023-04-26

## 2023-06-12 ENCOUNTER — OFFICE (OUTPATIENT)
Dept: URBAN - METROPOLITAN AREA CLINIC 32 | Facility: CLINIC | Age: 58
Setting detail: OPHTHALMOLOGY
End: 2023-06-12
Payer: COMMERCIAL

## 2023-06-12 DIAGNOSIS — H43.811: ICD-10-CM

## 2023-06-12 DIAGNOSIS — H10.89: ICD-10-CM

## 2023-06-12 DIAGNOSIS — H57.12: ICD-10-CM

## 2023-06-12 DIAGNOSIS — H43.392: ICD-10-CM

## 2023-06-12 PROCEDURE — 92134 CPTRZ OPH DX IMG PST SGM RTA: CPT | Performed by: OPHTHALMOLOGY

## 2023-06-12 PROCEDURE — 99213 OFFICE O/P EST LOW 20 MIN: CPT | Performed by: OPHTHALMOLOGY

## 2023-06-12 ASSESSMENT — SPHEQUIV_DERIVED
OD_SPHEQUIV: -5.5
OS_SPHEQUIV: -6

## 2023-06-12 ASSESSMENT — CORNEAL SURGICAL SCARRING: OS_SCARRING: ANTERIOR

## 2023-06-12 ASSESSMENT — VASCULARIZATION: OD_VASCULARIZATION: PANNUS

## 2023-06-12 ASSESSMENT — REFRACTION_AUTOREFRACTION
OD_AXIS: 092
OS_SPHERE: -6.50
OS_AXIS: 077
OD_CYLINDER: +1.50
OS_CYLINDER: +1.00
OD_SPHERE: -6.25

## 2023-06-12 ASSESSMENT — REFRACTION_CURRENTRX
OS_AXIS: 173
OS_SPHERE: -5.00
OD_VPRISM_DIRECTION: SV
OS_VPRISM_DIRECTION: SV
OD_SPHERE: -5.00
OD_OVR_VA: 20/
OD_AXIS: 173
OS_OVR_VA: 20/
OS_CYLINDER: -1.00
OD_CYLINDER: -0.75

## 2023-06-12 ASSESSMENT — VISUAL ACUITY
OS_BCVA: 20/25
OD_BCVA: 20/20-1

## 2023-06-12 ASSESSMENT — AXIALLENGTH_DERIVED
OS_AL: 25.7418
OD_AL: 25.9488

## 2023-06-12 ASSESSMENT — CORNEAL DYSTROPHY: OD_DYSTROPHY: T MAPS SUPERIORLY

## 2023-06-12 ASSESSMENT — KERATOMETRY
OS_AXISANGLE_DEGREES: 081
OS_K2POWER_DIOPTERS: 45.00
OS_K1POWER_DIOPTERS: 44.00
OD_AXISANGLE_DEGREES: 091
OD_K2POWER_DIOPTERS: 44.25
OD_K1POWER_DIOPTERS: 42.75

## 2023-06-12 ASSESSMENT — CONFRONTATIONAL VISUAL FIELD TEST (CVF)
OD_FINDINGS: FULL
OS_FINDINGS: FULL

## 2023-06-12 ASSESSMENT — LID EXAM ASSESSMENTS: OD_MEIBOMITIS: RUL

## 2023-09-26 ENCOUNTER — OFFICE (OUTPATIENT)
Dept: URBAN - METROPOLITAN AREA CLINIC 35 | Facility: CLINIC | Age: 58
Setting detail: OPHTHALMOLOGY
End: 2023-09-26
Payer: COMMERCIAL

## 2023-09-26 DIAGNOSIS — H25.13: ICD-10-CM

## 2023-09-26 DIAGNOSIS — B02.1: ICD-10-CM

## 2023-09-26 DIAGNOSIS — H16.423: ICD-10-CM

## 2023-09-26 PROBLEM — H52.7 REFRACTIVE ERROR: Status: ACTIVE | Noted: 2023-09-26

## 2023-09-26 PROCEDURE — 99213 OFFICE O/P EST LOW 20 MIN: CPT | Performed by: OPHTHALMOLOGY

## 2023-09-26 ASSESSMENT — AXIALLENGTH_DERIVED
OS_AL: 25.6872
OD_AL: 25.8282

## 2023-09-26 ASSESSMENT — CONFRONTATIONAL VISUAL FIELD TEST (CVF)
OS_FINDINGS: FULL
OD_FINDINGS: FULL

## 2023-09-26 ASSESSMENT — REFRACTION_CURRENTRX
OS_OVR_VA: 20/
OD_OVR_VA: 20/
OS_CYLINDER: -1.00
OD_SPHERE: -5.25
OD_AXIS: 168
OS_AXIS: 173
OS_SPHERE: -5.00
OD_CYLINDER: -0.75
OD_VPRISM_DIRECTION: SV
OS_VPRISM_DIRECTION: SV

## 2023-09-26 ASSESSMENT — TONOMETRY
OD_IOP_MMHG: 16
OS_IOP_MMHG: 16

## 2023-09-26 ASSESSMENT — REFRACTION_AUTOREFRACTION
OS_AXIS: 170
OS_SPHERE: -5.25
OD_SPHERE: -4.50
OS_CYLINDER: -1.50
OD_CYLINDER: -1.25
OD_AXIS: 179

## 2023-09-26 ASSESSMENT — VISUAL ACUITY
OS_BCVA: 20/25+
OD_BCVA: 20/20-

## 2023-09-26 ASSESSMENT — VASCULARIZATION
OD_VASCULARIZATION: PANNUS
OS_VASCULARIZATION: PANNUS

## 2023-09-26 ASSESSMENT — KERATOMETRY
OS_K2POWER_DIOPTERS: 45.50
OD_K1POWER_DIOPTERS: 42.75
OS_AXISANGLE_DEGREES: 086
OD_AXISANGLE_DEGREES: 088
OD_K2POWER_DIOPTERS: 44.00
OS_K1POWER_DIOPTERS: 43.75

## 2023-09-26 ASSESSMENT — SPHEQUIV_DERIVED
OS_SPHEQUIV: -6
OD_SPHEQUIV: -5.125

## 2023-09-27 ENCOUNTER — NON-APPOINTMENT (OUTPATIENT)
Age: 58
End: 2023-09-27

## 2024-07-28 ENCOUNTER — NON-APPOINTMENT (OUTPATIENT)
Age: 59
End: 2024-07-28

## 2024-10-02 ENCOUNTER — DOCTOR'S OFFICE (OUTPATIENT)
Facility: LOCATION | Age: 59
Setting detail: OPHTHALMOLOGY
End: 2024-10-02
Payer: MEDICARE

## 2024-10-02 DIAGNOSIS — H16.423: ICD-10-CM

## 2024-10-02 DIAGNOSIS — H55.00: ICD-10-CM

## 2024-10-02 DIAGNOSIS — H52.13: ICD-10-CM

## 2024-10-02 DIAGNOSIS — H43.392: ICD-10-CM

## 2024-10-02 DIAGNOSIS — H52.7: ICD-10-CM

## 2024-10-02 DIAGNOSIS — H25.13: ICD-10-CM

## 2024-10-02 DIAGNOSIS — H52.223: ICD-10-CM

## 2024-10-02 PROCEDURE — 92014 COMPRE OPH EXAM EST PT 1/>: CPT | Performed by: OPHTHALMOLOGY

## 2024-10-02 ASSESSMENT — REFRACTION_CURRENTRX
OS_AXIS: 170
OD_OVR_VA: 20/
OD_SPHERE: -5.00
OD_SPHERE: -4.75
OS_AXIS: 170
OD_AXIS: 180
OS_VPRISM_DIRECTION: SV
OS_OVR_VA: 20/
OS_VPRISM_DIRECTION: SV
OS_CYLINDER: -1.00
OS_SPHERE: -5.25
OD_AXIS: 170
OD_CYLINDER: -0.75
OD_CYLINDER: -0.75
OD_SPHERE: -4.75
OS_AXIS: 180
OD_VPRISM_DIRECTION: SV
OS_OVR_VA: 20/
OS_SPHERE: -5.25
OD_CYLINDER: -0.75
OS_CYLINDER: -0.75
OD_OVR_VA: 20/
OD_VPRISM_DIRECTION: SV
OS_OVR_VA: 20/
OD_OVR_VA: 20/
OD_AXIS: 010
OS_CYLINDER: -1.00
OS_SPHERE: -5.00

## 2024-10-02 ASSESSMENT — REFRACTION_MANIFEST
OS_CYLINDER: -0.75
OD_SPHERE: -4.50
OS_VA1: 20/20
OD_AXIS: 010
OS_SPHERE: -4.75
OD_VA1: 20/20
OD_SPHERE: -4.50
OD_CYLINDER: -0.75
OS_AXIS: 170
OS_VA1: 20/20
OD_AXIS: 010
OD_VA1: 20/20
OS_CYLINDER: -1.25
OS_AXIS: 170
OS_SPHERE: -5.00
OD_CYLINDER: -0.75

## 2024-10-02 ASSESSMENT — KERATOMETRY
OS_K1POWER_DIOPTERS: 44.25
OD_K2POWER_DIOPTERS: 44.25
OS_AXISANGLE_DEGREES: 086
OD_K1POWER_DIOPTERS: 43.00
OD_AXISANGLE_DEGREES: 088
OS_K2POWER_DIOPTERS: 45.25

## 2024-10-02 ASSESSMENT — TONOMETRY
OS_IOP_MMHG: 18
OD_IOP_MMHG: 18

## 2024-10-02 ASSESSMENT — REFRACTION_AUTOREFRACTION
OS_AXIS: 170
OD_CYLINDER: -0.75
OS_SPHERE: -5.25
OD_AXIS: 010
OS_CYLINDER: -1.25
OD_SPHERE: -4.50

## 2024-10-02 ASSESSMENT — CONFRONTATIONAL VISUAL FIELD TEST (CVF)
OS_FINDINGS: FULL
OD_FINDINGS: FULL

## 2024-10-02 ASSESSMENT — VASCULARIZATION
OD_VASCULARIZATION: PANNUS
OS_VASCULARIZATION: PANNUS

## 2024-10-02 ASSESSMENT — VISUAL ACUITY
OD_BCVA: 20/20-1
OS_BCVA: 20/25

## 2024-10-17 ENCOUNTER — DOCTOR'S OFFICE (OUTPATIENT)
Facility: LOCATION | Age: 59
Setting detail: OPHTHALMOLOGY
End: 2024-10-17
Payer: MEDICARE

## 2024-10-17 DIAGNOSIS — H43.811: ICD-10-CM

## 2024-10-17 DIAGNOSIS — H43.392: ICD-10-CM

## 2024-10-17 PROBLEM — H55.00 NYSTAGMUS, UNSPECIFIED: Status: ACTIVE | Noted: 2024-10-02

## 2024-10-17 PROCEDURE — 92134 CPTRZ OPH DX IMG PST SGM RTA: CPT | Performed by: OPHTHALMOLOGY

## 2024-10-17 PROCEDURE — 99213 OFFICE O/P EST LOW 20 MIN: CPT | Performed by: OPHTHALMOLOGY

## 2024-10-17 ASSESSMENT — REFRACTION_AUTOREFRACTION
OS_AXIS: 170
OD_SPHERE: -4.50
OD_AXIS: 010
OS_CYLINDER: -1.25
OD_CYLINDER: -0.75
OS_SPHERE: -5.25

## 2024-10-17 ASSESSMENT — KERATOMETRY
OS_AXISANGLE_DEGREES: 086
OS_K1POWER_DIOPTERS: 44.25
OD_K1POWER_DIOPTERS: 43.00
OD_AXISANGLE_DEGREES: 088
OS_K2POWER_DIOPTERS: 45.25
OD_K2POWER_DIOPTERS: 44.25

## 2024-10-17 ASSESSMENT — VASCULARIZATION
OS_VASCULARIZATION: PANNUS
OD_VASCULARIZATION: PANNUS

## 2024-10-17 ASSESSMENT — VISUAL ACUITY
OD_BCVA: 20/20-1
OS_BCVA: 20/20-2

## 2024-11-13 ENCOUNTER — NON-APPOINTMENT (OUTPATIENT)
Age: 59
End: 2024-11-13

## 2024-12-06 ENCOUNTER — NON-APPOINTMENT (OUTPATIENT)
Age: 59
End: 2024-12-06

## 2025-05-22 ENCOUNTER — NON-APPOINTMENT (OUTPATIENT)
Age: 60
End: 2025-05-22

## 2025-07-18 ENCOUNTER — DOCTOR'S OFFICE (OUTPATIENT)
Facility: LOCATION | Age: 60
Setting detail: OPHTHALMOLOGY
End: 2025-07-18
Payer: MEDICARE

## 2025-07-18 DIAGNOSIS — H16.223: ICD-10-CM

## 2025-07-18 DIAGNOSIS — H00.024: ICD-10-CM

## 2025-07-18 DIAGNOSIS — H00.021: ICD-10-CM

## 2025-07-18 DIAGNOSIS — H25.13: ICD-10-CM

## 2025-07-18 PROCEDURE — 99213 OFFICE O/P EST LOW 20 MIN: CPT | Performed by: OPHTHALMOLOGY

## 2025-07-18 ASSESSMENT — KERATOMETRY
OS_K2POWER_DIOPTERS: 45.25
OD_K1POWER_DIOPTERS: 43.00
OS_AXISANGLE_DEGREES: 086
OS_K1POWER_DIOPTERS: 44.25
OD_AXISANGLE_DEGREES: 088
OD_K2POWER_DIOPTERS: 44.25

## 2025-07-18 ASSESSMENT — LID EXAM ASSESSMENTS
OS_MEIBOMITIS: LUL
OD_COMMENTS: 1+ TELANGIECTASIA, HYPEREMIA
OD_MEIBOMITIS: RUL

## 2025-07-18 ASSESSMENT — VASCULARIZATION
OS_VASCULARIZATION: PANNUS
OD_VASCULARIZATION: PANNUS

## 2025-07-18 ASSESSMENT — VISUAL ACUITY
OS_BCVA: 20/20-2
OD_BCVA: 20/20-

## 2025-07-18 ASSESSMENT — TONOMETRY
OS_IOP_MMHG: 10
OD_IOP_MMHG: 16

## 2025-07-18 ASSESSMENT — REFRACTION_AUTOREFRACTION
OS_SPHERE: -5.25
OD_CYLINDER: -0.75
OD_SPHERE: -4.50
OS_CYLINDER: -1.25
OS_AXIS: 170
OD_AXIS: 010